# Patient Record
Sex: MALE | Race: WHITE | NOT HISPANIC OR LATINO | URBAN - METROPOLITAN AREA
[De-identification: names, ages, dates, MRNs, and addresses within clinical notes are randomized per-mention and may not be internally consistent; named-entity substitution may affect disease eponyms.]

---

## 2020-07-22 ENCOUNTER — OFFICE VISIT (OUTPATIENT)
Dept: URBAN - METROPOLITAN AREA CLINIC 113 | Facility: CLINIC | Age: 75
End: 2020-07-22

## 2020-07-24 ENCOUNTER — OFFICE VISIT (OUTPATIENT)
Dept: URBAN - METROPOLITAN AREA SURGERY CENTER 25 | Facility: SURGERY CENTER | Age: 75
End: 2020-07-24

## 2020-07-24 ENCOUNTER — CLAIMS CREATED FROM THE CLAIM WINDOW (OUTPATIENT)
Dept: URBAN - METROPOLITAN AREA CLINIC 4 | Facility: CLINIC | Age: 75
End: 2020-07-24
Payer: MEDICARE

## 2020-07-24 DIAGNOSIS — D12.2 BENIGN NEOPLASM OF ASCENDING COLON: ICD-10-CM

## 2020-07-24 DIAGNOSIS — D12.0 BENIGN NEOPLASM OF CECUM: ICD-10-CM

## 2020-07-24 PROCEDURE — 88305 TISSUE EXAM BY PATHOLOGIST: CPT | Performed by: PATHOLOGY

## 2020-07-25 ENCOUNTER — TELEPHONE ENCOUNTER (OUTPATIENT)
Dept: URBAN - METROPOLITAN AREA CLINIC 13 | Facility: CLINIC | Age: 75
End: 2020-07-25

## 2020-07-25 RX ORDER — HYDROCHLOROTHIAZIDE 25 MG/1
TAKE 1 TABLET DAILY TABLET ORAL
Qty: 90 | Refills: 0 | OUTPATIENT
Start: 2012-08-01 | End: 2016-12-16

## 2020-07-25 RX ORDER — HYDROCODONE BITARTRATE AND ACETAMINOPHEN 7.5; 325 MG/1; MG/1
TAKE 1 TABLET EVERY 6 HOURS AS NEEDED FOR PAIN TABLET ORAL
Refills: 0 | OUTPATIENT
End: 2020-07-22

## 2020-07-25 RX ORDER — PANTOPRAZOLE SODIUM 40 MG/1
TAKE 1 TABLET TWICE DAILY TABLET, DELAYED RELEASE ORAL
Qty: 60 | Refills: 0 | OUTPATIENT
Start: 2014-04-24 | End: 2014-07-23

## 2020-07-25 RX ORDER — POLYETHYLENE GLYCOL 3350, SODIUM CHLORIDE, SODIUM BICARBONATE AND POTASSIUM CHLORIDE WITH LEMON FLAVOR 420; 11.2; 5.72; 1.48 G/4L; G/4L; G/4L; G/4L
USE AS DIRECTED POWDER, FOR SOLUTION ORAL
Qty: 1 | Refills: 0 | OUTPATIENT
Start: 2013-01-14 | End: 2013-02-01

## 2020-07-25 RX ORDER — POLYETHYLENE GLYCOL 3350, SODIUM CHLORIDE, SODIUM BICARBONATE AND POTASSIUM CHLORIDE WITH LEMON FLAVOR 420; 11.2; 5.72; 1.48 G/4L; G/4L; G/4L; G/4L
TAKE HALF 5PM THE EVENING BEFORE THE PROCEDURE, AND TAKE THE OTHER HALF 6 HOURS BEFORE THE PROCEDURE POWDER, FOR SOLUTION ORAL
Qty: 1 | Refills: 0 | OUTPATIENT
Start: 2016-12-16 | End: 2017-02-01

## 2020-07-25 RX ORDER — FLUCONAZOLE 100 MG/1
TAKE 2 TABLETS ON DAY 1 THEN TAKE 1 TABLET BY MOUTH EVERY DAY AS DIRECTED TABLET ORAL
Qty: 10 | Refills: 0 | OUTPATIENT
Start: 2014-07-23 | End: 2016-12-16

## 2020-07-25 RX ORDER — POLYETHYLENE GLYCOL 3350, SODIUM CHLORIDE, SODIUM BICARBONATE AND POTASSIUM CHLORIDE WITH LEMON FLAVOR 420; 11.2; 5.72; 1.48 G/4L; G/4L; G/4L; G/4L
USE AS DIRECTED POWDER, FOR SOLUTION ORAL
Qty: 1 | Refills: 1 | OUTPATIENT
Start: 2020-07-22 | End: 2020-07-24

## 2020-07-25 RX ORDER — CARVEDILOL 6.25 MG/1
TAKE 1 TABLET TWICE DAILY,  WITH MORNING AND EVENING MEAL TABLET, FILM COATED ORAL
Qty: 60 | Refills: 0 | OUTPATIENT
Start: 2013-05-30 | End: 2020-07-22

## 2020-07-25 RX ORDER — VENLAFAXINE HYDROCHLORIDE 37.5 MG/1
TAKE 1 CAPSULE DAILY CAPSULE, EXTENDED RELEASE ORAL
Qty: 30 | Refills: 0 | OUTPATIENT
Start: 2012-12-30 | End: 2014-04-25

## 2020-07-25 RX ORDER — AMLODIPINE BESYLATE 10 MG/1
TAKE 1 TABLET DAILY TABLET ORAL
Refills: 0 | OUTPATIENT
Start: 2013-01-02 | End: 2020-07-22

## 2020-07-25 RX ORDER — MULTIVITAMIN
TAKE 1 CAPSULE DAILY CAPSULE ORAL
Refills: 0 | OUTPATIENT
End: 2020-07-24

## 2020-07-25 RX ORDER — PANTOPRAZOLE SODIUM 40 MG/1
TAKE 1 TABLET 30 MINUTES BEFORE BREAKFAST DAILY TABLET, DELAYED RELEASE ORAL
Qty: 30 | Refills: 5 | OUTPATIENT
Start: 2014-08-21 | End: 2016-12-16

## 2020-07-25 RX ORDER — AMITRIPTYLINE HYDROCHLORIDE 75 MG/1
TAKE 1 TABLET AT BEDTIME.  PT STATES DOSE IS 12.5MG TABLET, FILM COATED ORAL
Refills: 0 | OUTPATIENT
End: 2020-07-22

## 2020-07-25 RX ORDER — DEXTRIN 3 G/3.5 G
TAKE AS DIRECTED, PT STATES UNKNOWN DOSE POWDER (GRAM) ORAL
Refills: 0 | OUTPATIENT
End: 2017-02-01

## 2020-07-26 ENCOUNTER — TELEPHONE ENCOUNTER (OUTPATIENT)
Dept: URBAN - METROPOLITAN AREA CLINIC 13 | Facility: CLINIC | Age: 75
End: 2020-07-26

## 2020-07-26 RX ORDER — DICLOFENAC SODIUM 1 %
GEL (GRAM) TOPICAL
Qty: 100 | Refills: 0 | Status: ACTIVE | COMMUNITY
Start: 2014-12-02

## 2020-07-26 RX ORDER — LIDOCAINE HYDROCHLORIDE 20 MG/ML
SOLUTION ORAL; TOPICAL
Qty: 100 | Refills: 0 | Status: ACTIVE | COMMUNITY
Start: 2019-08-04

## 2020-07-26 RX ORDER — OXYCODONE AND ACETAMINOPHEN 5; 325 MG/1; MG/1
TABLET ORAL
Qty: 40 | Refills: 0 | Status: ACTIVE | COMMUNITY
Start: 2014-09-10

## 2020-07-26 RX ORDER — CHLORHEXIDINE GLUCONATE 4 %
TAKE 1 TABLET DAILY AS DIRECTED LIQUID (ML) TOPICAL
Refills: 0 | Status: ACTIVE | COMMUNITY

## 2020-07-26 RX ORDER — POTASSIUM CHLORIDE 1500 MG/1
TAKE 1 TABLET DAILY TABLET, EXTENDED RELEASE ORAL
Qty: 30 | Refills: 0 | Status: ACTIVE | COMMUNITY
Start: 2013-06-07

## 2020-07-26 RX ORDER — HYDROXYZINE HYDROCHLORIDE 10 MG/1
TABLET ORAL
Qty: 60 | Refills: 0 | Status: ACTIVE | COMMUNITY
Start: 2020-07-15

## 2020-07-26 RX ORDER — DIAZEPAM 5 MG/1
TABLET ORAL
Qty: 90 | Refills: 0 | Status: ACTIVE | COMMUNITY
Start: 2014-03-21

## 2020-07-26 RX ORDER — HYDROCODONE BITARTRATE AND ACETAMINOPHEN 7.5; 325 MG/1; MG/1
TABLET ORAL
Qty: 60 | Refills: 0 | Status: ACTIVE | COMMUNITY
Start: 2012-09-26

## 2020-07-26 RX ORDER — CARVEDILOL 3.12 MG/1
TAKE 1 TABLET TWICE DAILY,  WITH MORNING AND EVENING MEAL TABLET, FILM COATED ORAL
Refills: 0 | Status: ACTIVE | COMMUNITY
Start: 2020-06-29

## 2020-07-26 RX ORDER — HYDROCHLOROTHIAZIDE 25 MG/1
TABLET ORAL
Qty: 90 | Refills: 0 | Status: ACTIVE | COMMUNITY
Start: 2012-05-28

## 2020-07-26 RX ORDER — ATORVASTATIN CALCIUM 20 MG/1
TABLET, FILM COATED ORAL
Qty: 90 | Refills: 0 | Status: ACTIVE | COMMUNITY
Start: 2012-08-15

## 2020-07-26 RX ORDER — CEPHALEXIN 500 MG/1
CAPSULE ORAL
Qty: 20 | Refills: 0 | Status: ACTIVE | COMMUNITY
Start: 2014-03-21

## 2020-07-26 RX ORDER — ATORVASTATIN CALCIUM 40 MG/1
TABLET, FILM COATED ORAL
Qty: 90 | Refills: 0 | Status: ACTIVE | COMMUNITY
Start: 2012-03-06

## 2020-07-26 RX ORDER — OXYCODONE 15 MG/1
TAKE 1 TABLET 4 TIMES DAILY TABLET ORAL
Refills: 0 | Status: ACTIVE | COMMUNITY
Start: 2020-06-16

## 2020-07-26 RX ORDER — CEPHALEXIN 250 MG/1
CAPSULE ORAL
Qty: 21 | Refills: 0 | Status: ACTIVE | COMMUNITY
Start: 2019-08-08

## 2020-07-26 RX ORDER — POLYETHYLENE GLYCOL 3350 AND ELECTROLYTES WITH LEMON FLAVOR 236; 22.74; 6.74; 5.86; 2.97 G/4L; G/4L; G/4L; G/4L; G/4L
POWDER, FOR SOLUTION ORAL
Qty: 255 | Refills: 0 | Status: ACTIVE | COMMUNITY
Start: 2013-01-14

## 2020-07-26 RX ORDER — DICLOFENAC SODIUM 10 MG/G
GEL TOPICAL
Qty: 500 | Refills: 0 | Status: ACTIVE | COMMUNITY
Start: 2019-08-02

## 2020-07-26 RX ORDER — DIAZEPAM 5 MG/1
TABLET ORAL
Qty: 3 | Refills: 0 | Status: ACTIVE | COMMUNITY
Start: 2014-02-04

## 2020-07-26 RX ORDER — ATORVASTATIN CALCIUM 40 MG/1
TABLET, FILM COATED ORAL
Qty: 90 | Refills: 0 | Status: ACTIVE | COMMUNITY
Start: 2012-05-28

## 2020-07-26 RX ORDER — HYDROCODONE BITARTRATE AND ACETAMINOPHEN 10; 325 MG/1; MG/1
TAKE 1 TABLET BY MOUTH EVERY 4 TO 6 HOURS AS NEEDED FOR PAIN TABLET ORAL
Qty: 80 | Refills: 0 | Status: ACTIVE | COMMUNITY
Start: 2012-08-28

## 2020-07-26 RX ORDER — AMLODIPINE BESYLATE 5 MG/1
TAKE 1 TABLET DAILY TABLET ORAL
Refills: 0 | Status: ACTIVE | COMMUNITY
Start: 2020-06-29

## 2020-08-25 ENCOUNTER — OFFICE VISIT (OUTPATIENT)
Dept: URBAN - METROPOLITAN AREA CLINIC 113 | Facility: CLINIC | Age: 75
End: 2020-08-25

## 2020-08-25 NOTE — HPI-TODAY'S VISIT:
This is a 74-year-old male with a history of chronic back pain status post back surgery on chronic narcotics, dysphagia secondary to Candida esophagitis status post treatment with Diflucan in 2014, and adenomatous colon polyps, presenting for follow-up colonoscopy. He was last seen 7/22/2020 with report of a recent diarrhea predominant change in bowel habits, that had self resolved.  He was recommended surveillance colonoscopy for history of colon adenomas. Colonoscopy was performed 7/24/2020 notable for good bowel preparation, removal of 7 polyps ranging 3 to 6 mm in size, diverticulosis in the sigmoid and descending colon, significant colon spasm, nonbleeding internal hemorrhoids.  Pathology demonstrated tubular adenomas.  Repeat colonoscopy is recommended in 3 years.

## 2021-02-22 ENCOUNTER — OFFICE VISIT (OUTPATIENT)
Dept: URBAN - METROPOLITAN AREA CLINIC 107 | Facility: CLINIC | Age: 76
End: 2021-02-22
Payer: MEDICARE

## 2021-02-22 ENCOUNTER — WEB ENCOUNTER (OUTPATIENT)
Dept: URBAN - METROPOLITAN AREA CLINIC 107 | Facility: CLINIC | Age: 76
End: 2021-02-22

## 2021-02-22 VITALS
BODY MASS INDEX: 22.35 KG/M2 | TEMPERATURE: 98.8 F | HEART RATE: 102 BPM | SYSTOLIC BLOOD PRESSURE: 127 MMHG | WEIGHT: 165 LBS | HEIGHT: 72 IN | DIASTOLIC BLOOD PRESSURE: 75 MMHG

## 2021-02-22 DIAGNOSIS — Z86.010 HISTORY OF COLON POLYPS: ICD-10-CM

## 2021-02-22 DIAGNOSIS — K59.01 SLOW TRANSIT CONSTIPATION: ICD-10-CM

## 2021-02-22 PROBLEM — 35298007: Status: ACTIVE | Noted: 2021-02-22

## 2021-02-22 PROCEDURE — 99203 OFFICE O/P NEW LOW 30 MIN: CPT | Performed by: NURSE PRACTITIONER

## 2021-02-22 RX ORDER — HYDROCODONE BITARTRATE AND ACETAMINOPHEN 10; 325 MG/1; MG/1
TAKE 1 TABLET BY MOUTH EVERY 4 TO 6 HOURS AS NEEDED FOR PAIN TABLET ORAL
Qty: 80 | Refills: 0 | Status: ON HOLD | COMMUNITY
Start: 2012-08-28

## 2021-02-22 RX ORDER — CEPHALEXIN 250 MG/1
CAPSULE ORAL
Qty: 21 | Refills: 0 | Status: ON HOLD | COMMUNITY
Start: 2019-08-08

## 2021-02-22 RX ORDER — POLYETHYLENE GLYCOL 3350 AND ELECTROLYTES WITH LEMON FLAVOR 236; 22.74; 6.74; 5.86; 2.97 G/4L; G/4L; G/4L; G/4L; G/4L
POWDER, FOR SOLUTION ORAL
Qty: 255 | Refills: 0 | Status: ON HOLD | COMMUNITY
Start: 2013-01-14

## 2021-02-22 RX ORDER — CEPHALEXIN 500 MG/1
CAPSULE ORAL
Qty: 20 | Refills: 0 | Status: ON HOLD | COMMUNITY
Start: 2014-03-21

## 2021-02-22 RX ORDER — VENLAFAXINE HYDROCHLORIDE 37.5 MG/1
1 TABLET WITH FOOD TABLET ORAL ONCE A DAY
Status: ACTIVE | COMMUNITY

## 2021-02-22 RX ORDER — HYDROCHLOROTHIAZIDE 25 MG/1
TABLET ORAL
Qty: 90 | Refills: 0 | Status: ON HOLD | COMMUNITY
Start: 2012-05-28

## 2021-02-22 RX ORDER — OXYCODONE AND ACETAMINOPHEN 5; 325 MG/1; MG/1
TABLET ORAL
Qty: 40 | Refills: 0 | Status: ACTIVE | COMMUNITY
Start: 2014-09-10

## 2021-02-22 RX ORDER — LIDOCAINE HYDROCHLORIDE 20 MG/ML
SOLUTION ORAL; TOPICAL
Qty: 100 | Refills: 0 | Status: ON HOLD | COMMUNITY
Start: 2019-08-04

## 2021-02-22 RX ORDER — ATORVASTATIN CALCIUM 40 MG/1
TABLET, FILM COATED ORAL
Qty: 90 | Refills: 0 | Status: ON HOLD | COMMUNITY
Start: 2012-03-06

## 2021-02-22 RX ORDER — POTASSIUM CHLORIDE 1500 MG/1
TAKE 1 TABLET DAILY TABLET, EXTENDED RELEASE ORAL
Qty: 30 | Refills: 0 | Status: ACTIVE | COMMUNITY
Start: 2013-06-07

## 2021-02-22 RX ORDER — OXYCODONE 15 MG/1
TAKE 1 TABLET 4 TIMES DAILY TABLET ORAL
Refills: 0 | Status: ON HOLD | COMMUNITY
Start: 2020-06-16

## 2021-02-22 RX ORDER — DICLOFENAC SODIUM 1 %
GEL (GRAM) TOPICAL
Qty: 100 | Refills: 0 | Status: ON HOLD | COMMUNITY
Start: 2014-12-02

## 2021-02-22 RX ORDER — DICLOFENAC SODIUM 10 MG/G
GEL TOPICAL
Qty: 500 | Refills: 0 | Status: ON HOLD | COMMUNITY
Start: 2019-08-02

## 2021-02-22 RX ORDER — DIAZEPAM 5 MG/1
TABLET ORAL
Qty: 3 | Refills: 0 | Status: ON HOLD | COMMUNITY
Start: 2014-02-04

## 2021-02-22 RX ORDER — HYDROXYZINE HYDROCHLORIDE 10 MG/1
TABLET ORAL
Qty: 60 | Refills: 0 | Status: ON HOLD | COMMUNITY
Start: 2020-07-15

## 2021-02-22 RX ORDER — ATORVASTATIN CALCIUM 20 MG/1
TABLET, FILM COATED ORAL
Qty: 90 | Refills: 0 | Status: ACTIVE | COMMUNITY
Start: 2012-08-15

## 2021-02-22 RX ORDER — CHLORHEXIDINE GLUCONATE 4 %
TAKE 1 TABLET DAILY AS DIRECTED LIQUID (ML) TOPICAL
Refills: 0 | Status: ACTIVE | COMMUNITY

## 2021-02-22 RX ORDER — AMLODIPINE BESYLATE 5 MG/1
TAKE 1 TABLET DAILY TABLET ORAL
Refills: 0 | Status: ACTIVE | COMMUNITY
Start: 2020-06-29

## 2021-02-22 RX ORDER — CARVEDILOL 3.12 MG/1
TAKE 1 TABLET TWICE DAILY,  WITH MORNING AND EVENING MEAL TABLET, FILM COATED ORAL
Refills: 0 | Status: ACTIVE | COMMUNITY
Start: 2020-06-29

## 2021-02-22 RX ORDER — HYDROCODONE BITARTRATE AND ACETAMINOPHEN 7.5; 325 MG/1; MG/1
TABLET ORAL
Qty: 60 | Refills: 0 | Status: ON HOLD | COMMUNITY
Start: 2012-09-26

## 2021-02-22 NOTE — HPI-TODAY'S VISIT:
75-year-old male with a history of chronic back pain status post back surgery on chronic narcotics, dysphagia secondary to Candida esophagitis status post treatment with Diflucan (2014) and adenomatous colon polyps, presenting for follow-up regarding constipation. He was last seen in the office on 7/22/2020.  He reported a transient episode of diarrhea predominant change in bowel habits which resolved on its own.  He was noted to be due for surveillance colonoscopy given his personal history of tubular adenomas.  This was scheduled at his convenience. Colonoscopy was performed 7/24/2020.  Findings included good bowel preparation with [place] bowel preparation scale score 9.  7 polyps (3 to 6 mm) were resected and retrieved.  Diverticulosis noted in the sigmoid colon and descending colon.  Nonbleeding internal hemorrhoids.  Significant colon spasm.  Pathology revealed tubular adenomas.  Repeat colonoscopy is recommended in 3 years.  He reports that he is constipated, "living off laxatives for the last 3 weeks." He recently was on antibiotics and prednisone for a urinary tract infection and rheumatologic disease. He is requirine laxatives about every 2 days. There is no lower abdominal pain. He uses dulcolax , epsom salts, and equate clearlax. He does not use anything daily. No blood per rectum. When he poops, he does feel relieved. Stools are loose due to laxatives. There is no nausea or vomiting. Appetite is fair. He takes oxycodone infrequently, but then states that he has been taking one tablet every morning for 30-something years. Historically, powder fibers have been ineffective. He admits increased flatulence.

## 2022-01-04 ENCOUNTER — OFFICE VISIT (OUTPATIENT)
Dept: URBAN - METROPOLITAN AREA CLINIC 113 | Facility: CLINIC | Age: 77
End: 2022-01-04
Payer: MEDICARE

## 2022-01-04 VITALS
SYSTOLIC BLOOD PRESSURE: 169 MMHG | RESPIRATION RATE: 20 BRPM | TEMPERATURE: 96.8 F | WEIGHT: 179 LBS | DIASTOLIC BLOOD PRESSURE: 101 MMHG | BODY MASS INDEX: 24.24 KG/M2 | HEART RATE: 104 BPM | HEIGHT: 72 IN

## 2022-01-04 DIAGNOSIS — R19.4 CHANGE IN BOWEL HABITS: ICD-10-CM

## 2022-01-04 DIAGNOSIS — R19.7 DIARRHEA, UNSPECIFIED TYPE: ICD-10-CM

## 2022-01-04 DIAGNOSIS — L29.9 GENERALIZED PRURITUS: ICD-10-CM

## 2022-01-04 PROCEDURE — 99213 OFFICE O/P EST LOW 20 MIN: CPT | Performed by: PHYSICIAN ASSISTANT

## 2022-01-04 RX ORDER — VENLAFAXINE HYDROCHLORIDE 37.5 MG/1
1 TABLET WITH FOOD TABLET ORAL ONCE A DAY
Status: ACTIVE | COMMUNITY

## 2022-01-04 RX ORDER — CEPHALEXIN 500 MG/1
CAPSULE ORAL
Qty: 20 | Refills: 0 | Status: ON HOLD | COMMUNITY
Start: 2014-03-21

## 2022-01-04 RX ORDER — HYDROCODONE BITARTRATE AND ACETAMINOPHEN 10; 325 MG/1; MG/1
TAKE 1 TABLET BY MOUTH EVERY 4 TO 6 HOURS AS NEEDED FOR PAIN TABLET ORAL
Qty: 80 | Refills: 0 | Status: ON HOLD | COMMUNITY
Start: 2012-08-28

## 2022-01-04 RX ORDER — HYDROCHLOROTHIAZIDE 25 MG/1
TABLET ORAL
Qty: 90 | Refills: 0 | Status: ON HOLD | COMMUNITY
Start: 2012-05-28

## 2022-01-04 RX ORDER — UBIDECARENONE 30 MG
AS DIRECTED CAPSULE ORAL
Status: ACTIVE | COMMUNITY

## 2022-01-04 RX ORDER — CARVEDILOL 3.12 MG/1
TAKE 1 TABLET TWICE DAILY,  WITH MORNING AND EVENING MEAL TABLET, FILM COATED ORAL
Refills: 0 | Status: ACTIVE | COMMUNITY
Start: 2020-06-29

## 2022-01-04 RX ORDER — CEPHALEXIN 250 MG/1
CAPSULE ORAL
Qty: 21 | Refills: 0 | Status: ON HOLD | COMMUNITY
Start: 2019-08-08

## 2022-01-04 RX ORDER — DIAZEPAM 5 MG/1
TABLET ORAL
Qty: 3 | Refills: 0 | Status: ON HOLD | COMMUNITY
Start: 2014-02-04

## 2022-01-04 RX ORDER — HYDROXYZINE HYDROCHLORIDE 10 MG/1
TABLET ORAL
Qty: 60 | Refills: 0 | Status: ON HOLD | COMMUNITY
Start: 2020-07-15

## 2022-01-04 RX ORDER — POTASSIUM CHLORIDE 1500 MG/1
TAKE 1 TABLET DAILY TABLET, EXTENDED RELEASE ORAL
Qty: 30 | Refills: 0 | Status: ACTIVE | COMMUNITY
Start: 2013-06-07

## 2022-01-04 RX ORDER — OXYCODONE AND ACETAMINOPHEN 5; 325 MG/1; MG/1
TABLET ORAL
Qty: 40 | Refills: 0 | Status: DISCONTINUED | COMMUNITY
Start: 2014-09-10

## 2022-01-04 RX ORDER — DICLOFENAC SODIUM 10 MG/G
GEL TOPICAL
Qty: 500 | Refills: 0 | Status: ON HOLD | COMMUNITY
Start: 2019-08-02

## 2022-01-04 RX ORDER — ATORVASTATIN CALCIUM 40 MG/1
TABLET, FILM COATED ORAL
Qty: 90 | Refills: 0 | Status: ON HOLD | COMMUNITY
Start: 2012-03-06

## 2022-01-04 RX ORDER — OXYCODONE 15 MG/1
TAKE 1 TABLET 4 TIMES DAILY TABLET ORAL
Refills: 0 | Status: ON HOLD | COMMUNITY
Start: 2020-06-16

## 2022-01-04 RX ORDER — HYDROCODONE BITARTRATE AND ACETAMINOPHEN 7.5; 325 MG/1; MG/1
TABLET ORAL
Qty: 60 | Refills: 0 | Status: ON HOLD | COMMUNITY
Start: 2012-09-26

## 2022-01-04 RX ORDER — POLYETHYLENE GLYCOL 3350 AND ELECTROLYTES WITH LEMON FLAVOR 236; 22.74; 6.74; 5.86; 2.97 G/4L; G/4L; G/4L; G/4L; G/4L
POWDER, FOR SOLUTION ORAL
Qty: 255 | Refills: 0 | Status: ON HOLD | COMMUNITY
Start: 2013-01-14

## 2022-01-04 RX ORDER — DICLOFENAC SODIUM 1 %
GEL (GRAM) TOPICAL
Qty: 100 | Refills: 0 | Status: ON HOLD | COMMUNITY
Start: 2014-12-02

## 2022-01-04 RX ORDER — CHLORHEXIDINE GLUCONATE 4 %
TAKE 1 TABLET DAILY AS DIRECTED LIQUID (ML) TOPICAL
Refills: 0 | Status: ACTIVE | COMMUNITY

## 2022-01-04 RX ORDER — ATORVASTATIN CALCIUM 20 MG/1
1 TABLET TABLET, FILM COATED ORAL ONCE A DAY
Refills: 0 | Status: ACTIVE | COMMUNITY
Start: 2012-08-15

## 2022-01-04 RX ORDER — LIDOCAINE HYDROCHLORIDE 20 MG/ML
SOLUTION ORAL; TOPICAL
Qty: 100 | Refills: 0 | Status: ON HOLD | COMMUNITY
Start: 2019-08-04

## 2022-01-04 RX ORDER — AMLODIPINE BESYLATE 5 MG/1
TAKE 1 TABLET DAILY TABLET ORAL
Refills: 0 | Status: ACTIVE | COMMUNITY
Start: 2020-06-29

## 2022-01-04 NOTE — HPI-TODAY'S VISIT:
Mr. Davis is a 76-year-old gentleman with history of chronic back pain status post back surgery on chronic narcotics, dysphagia secondary to Candida esophagitis status post treatment with Diflucan (2014) and adenomatous colon polyps, presenting with stated complaints of change in bowel habits. He was last seen on 2/22/2021 for follow-up regarding chronic constipation.  His symptoms were thought to be secondary to narcotic pain medication use.  He was recommended to adhere to bowel regimen consisting of  along with MiraLAX and milk of magnesia as needed.  He was instructed to follow-up in 4 weeks, however, it appears that he never followed up with this office   For the past several days he has noticed a diarrhea predominant change in bowel habits after completing a prednisone taper.  He states that he was on prednisone for about a year for chronic arthralgias.  He states that he has up to six loose, soft bowel movements daily without red blood per rectum, melena or hematochezia.  Denies nocturnal diarrhea or fecal incontinence. He denies any associated abdominal pain, nausea or vomiting.  Denies antecedent antibiotic use.  He also endorses generalized pruritus which she believes is related to prednisone use.  He has an appointment with a dermatologist in the next couple of weeks for evaluation of the rash.  He also reports an 8 to 10 pound weight loss secondary to "poor appetite".  He is unable to attribute his diarrhea to any other known precipitating factor.  Denies any known sick contacts or recent travel.

## 2022-01-04 NOTE — PHYSICAL EXAM SKIN:
Erythematous, dry and excoriated skin noted to the nasolabial folds, upper chest, and bilateral upper extremities. No palpable lumps or  bumps.

## 2022-01-04 NOTE — HPI-OTHER HISTORIES
Colonoscopy (2/1/2017): [place] bowel preparation scale score of nine. Five polyps (3 to 6 mm) resected and retrieved.  Nonbleeding internal hemorrhoids.  Diverticulosis in the sigmoid colon.  Pathology revealed these to be a combination of benign mucosal polyps and tubular adenomas. EGD (7/23/2014): Diffuse candidiasis, mild erosive gastropathy, normal duodenum to D2.  Esophageal biopsies were negative for fungal organisms, gastric  biopsy demonstrated nonspecific changes.  No evidence of H. pylori.  Colonoscopy (2/1/2013): Adequate bowel prep.  Four diminutive polyps in the distal transverse colon.  Diverticulosis in the sigmoid colon.  These were noted to be sessile serrated adenomas without high-grade dysplasia  or malignancy. Colonoscopy (1/24/2008): Adequate bowel prep.  A 9 mm polyp at  55 cm proximal to the anus. Two 5 mm polyps  in the rectosigmoid colon and in the transverse colon.  Diverticulosis of the sigmoid colon.  Internal hemorrhoids were found.  These polyps were noted to be tubular adenomas.

## 2022-01-05 LAB
A/G RATIO: 1.7
ALBUMIN: 4.3
ALKALINE PHOSPHATASE: 96
ALT (SGPT): 10
AST (SGOT): 14
BASO (ABSOLUTE): 0.1
BASOS: 1
BILIRUBIN, TOTAL: 0.5
BUN/CREATININE RATIO: 7
BUN: 7
CALCIUM: 9.4
CARBON DIOXIDE, TOTAL: 27
CHLORIDE: 97
CREATININE: 0.96
EGFR IF AFRICN AM: 88
EGFR IF NONAFRICN AM: 76
EOS (ABSOLUTE): 0.2
EOS: 2
GLOBULIN, TOTAL: 2.6
GLUCOSE: 98
HEMATOCRIT: 39.8
HEMATOLOGY COMMENTS:: (no result)
HEMOGLOBIN: 13.5
IMMATURE CELLS: (no result)
IMMATURE GRANS (ABS): 0
IMMATURE GRANULOCYTES: 0
LYMPHS (ABSOLUTE): 2.1
LYMPHS: 22
MCH: 29.7
MCHC: 33.9
MCV: 88
MONOCYTES(ABSOLUTE): 1.2
MONOCYTES: 12
NEUTROPHILS (ABSOLUTE): 6.1
NEUTROPHILS: 63
NRBC: (no result)
PLATELETS: 365
POTASSIUM: 4.2
PROTEIN, TOTAL: 6.9
RBC: 4.55
RDW: 12.3
SODIUM: 138
WBC: 9.6

## 2022-01-09 LAB
ADENOVIRUS F 40/41: NOT DETECTED
ASTROVIRUS: NOT DETECTED
C DIFFICILE TOXIN A/B: NOT DETECTED
CALPROTECTIN, FECAL: 32
CAMPYLOBACTER: NOT DETECTED
CRYPTOSPORIDIUM: NOT DETECTED
CYCLOSPORA CAYETANENSIS: NOT DETECTED
E COLI O157: (no result)
ENTAMOEBA HISTOLYTICA: NOT DETECTED
ENTEROAGGREGATIVE E COLI: NOT DETECTED
ENTEROPATHOGENIC E COLI: NOT DETECTED
ENTEROTOXIGENIC E COLI: NOT DETECTED
GIARDIA LAMBLIA: NOT DETECTED
NOROVIRUS GI/GII: NOT DETECTED
PLESIOMONAS SHIGELLOIDES: NOT DETECTED
ROTAVIRUS A: NOT DETECTED
SALMONELLA: NOT DETECTED
SAPOVIRUS: NOT DETECTED
SHIGA-TOXIN-PRODUCING E COLI: NOT DETECTED
SHIGELLA/ENTEROINVASIVE E COLI: NOT DETECTED
VIBRIO CHOLERAE: NOT DETECTED
VIBRIO: NOT DETECTED
YERSINIA ENTEROCOLITICA: NOT DETECTED

## 2022-02-04 ENCOUNTER — WEB ENCOUNTER (OUTPATIENT)
Dept: URBAN - METROPOLITAN AREA CLINIC 113 | Facility: CLINIC | Age: 77
End: 2022-02-04

## 2022-02-04 ENCOUNTER — OFFICE VISIT (OUTPATIENT)
Dept: URBAN - METROPOLITAN AREA CLINIC 113 | Facility: CLINIC | Age: 77
End: 2022-02-04
Payer: MEDICARE

## 2022-02-04 VITALS
TEMPERATURE: 97.7 F | BODY MASS INDEX: 22.98 KG/M2 | WEIGHT: 169.7 LBS | DIASTOLIC BLOOD PRESSURE: 72 MMHG | RESPIRATION RATE: 22 BRPM | HEART RATE: 102 BPM | HEIGHT: 72 IN | SYSTOLIC BLOOD PRESSURE: 114 MMHG

## 2022-02-04 DIAGNOSIS — R19.4 CHANGE IN BOWEL HABITS: ICD-10-CM

## 2022-02-04 DIAGNOSIS — L29.9 GENERALIZED PRURITUS: ICD-10-CM

## 2022-02-04 DIAGNOSIS — R19.7 DIARRHEA, UNSPECIFIED TYPE: ICD-10-CM

## 2022-02-04 PROCEDURE — 99213 OFFICE O/P EST LOW 20 MIN: CPT | Performed by: PHYSICIAN ASSISTANT

## 2022-02-04 RX ORDER — DICLOFENAC SODIUM 1 %
GEL (GRAM) TOPICAL
Qty: 100 | Refills: 0 | Status: ON HOLD | COMMUNITY
Start: 2014-12-02

## 2022-02-04 RX ORDER — DICLOFENAC SODIUM 10 MG/G
GEL TOPICAL
Qty: 500 | Refills: 0 | Status: ON HOLD | COMMUNITY
Start: 2019-08-02

## 2022-02-04 RX ORDER — VENLAFAXINE HYDROCHLORIDE 37.5 MG/1
1 TABLET WITH FOOD TABLET ORAL ONCE A DAY
Status: ACTIVE | COMMUNITY

## 2022-02-04 RX ORDER — CHLORHEXIDINE GLUCONATE 4 %
TAKE 1 TABLET DAILY AS DIRECTED LIQUID (ML) TOPICAL
Refills: 0 | Status: ACTIVE | COMMUNITY

## 2022-02-04 RX ORDER — ATORVASTATIN CALCIUM 40 MG/1
TABLET, FILM COATED ORAL
Qty: 90 | Refills: 0 | Status: ON HOLD | COMMUNITY
Start: 2012-03-06

## 2022-02-04 RX ORDER — HYDROCHLOROTHIAZIDE 25 MG/1
TABLET ORAL
Qty: 90 | Refills: 0 | Status: ON HOLD | COMMUNITY
Start: 2012-05-28

## 2022-02-04 RX ORDER — ATORVASTATIN CALCIUM 20 MG/1
1 TABLET TABLET, FILM COATED ORAL ONCE A DAY
Refills: 0 | Status: ACTIVE | COMMUNITY
Start: 2012-08-15

## 2022-02-04 RX ORDER — UBIDECARENONE 30 MG
AS DIRECTED CAPSULE ORAL
Status: ACTIVE | COMMUNITY

## 2022-02-04 RX ORDER — AMLODIPINE BESYLATE 5 MG/1
TAKE 1 TABLET DAILY TABLET ORAL
Refills: 0 | Status: ACTIVE | COMMUNITY
Start: 2020-06-29

## 2022-02-04 RX ORDER — HYDROCODONE BITARTRATE AND ACETAMINOPHEN 7.5; 325 MG/1; MG/1
TABLET ORAL
Qty: 60 | Refills: 0 | Status: ON HOLD | COMMUNITY
Start: 2012-09-26

## 2022-02-04 RX ORDER — CEPHALEXIN 500 MG/1
CAPSULE ORAL
Qty: 20 | Refills: 0 | Status: ON HOLD | COMMUNITY
Start: 2014-03-21

## 2022-02-04 RX ORDER — CEPHALEXIN 250 MG/1
CAPSULE ORAL
Qty: 21 | Refills: 0 | Status: ON HOLD | COMMUNITY
Start: 2019-08-08

## 2022-02-04 RX ORDER — POLYETHYLENE GLYCOL 3350 AND ELECTROLYTES WITH LEMON FLAVOR 236; 22.74; 6.74; 5.86; 2.97 G/4L; G/4L; G/4L; G/4L; G/4L
POWDER, FOR SOLUTION ORAL
Qty: 255 | Refills: 0 | Status: ON HOLD | COMMUNITY
Start: 2013-01-14

## 2022-02-04 RX ORDER — HYDROXYZINE HYDROCHLORIDE 10 MG/1
TABLET ORAL
Qty: 60 | Refills: 0 | Status: ON HOLD | COMMUNITY
Start: 2020-07-15

## 2022-02-04 RX ORDER — LIDOCAINE HYDROCHLORIDE 20 MG/ML
SOLUTION ORAL; TOPICAL
Qty: 100 | Refills: 0 | Status: ON HOLD | COMMUNITY
Start: 2019-08-04

## 2022-02-04 RX ORDER — OXYCODONE 15 MG/1
TAKE 1 TABLET 4 TIMES DAILY TABLET ORAL
Refills: 0 | Status: ON HOLD | COMMUNITY
Start: 2020-06-16

## 2022-02-04 RX ORDER — POTASSIUM CHLORIDE 1500 MG/1
TAKE 1 TABLET DAILY TABLET, EXTENDED RELEASE ORAL
Qty: 30 | Refills: 0 | Status: ACTIVE | COMMUNITY
Start: 2013-06-07

## 2022-02-04 RX ORDER — CARVEDILOL 3.12 MG/1
TAKE 1 TABLET TWICE DAILY,  WITH MORNING AND EVENING MEAL TABLET, FILM COATED ORAL
Refills: 0 | Status: ACTIVE | COMMUNITY
Start: 2020-06-29

## 2022-02-04 RX ORDER — DIAZEPAM 5 MG/1
TABLET ORAL
Qty: 3 | Refills: 0 | Status: ON HOLD | COMMUNITY
Start: 2014-02-04

## 2022-02-04 RX ORDER — HYDROCODONE BITARTRATE AND ACETAMINOPHEN 10; 325 MG/1; MG/1
TAKE 1 TABLET BY MOUTH EVERY 4 TO 6 HOURS AS NEEDED FOR PAIN TABLET ORAL
Qty: 80 | Refills: 0 | Status: ON HOLD | COMMUNITY
Start: 2012-08-28

## 2022-02-04 NOTE — HPI-OTHER HISTORIES
Colonoscopy (2/1/2017): [place] bowel preparation scale score of nine. Five polyps (3 to 6 mm) resected and retrieved.  Nonbleeding internal hemorrhoids.  Diverticulosis in the sigmoid colon.  Pathology revealed these to be a combination of benign mucosal polyps and tubular adenomas. EGD (7/23/2014): Diffuse candidiasis, mild erosive gastropathy, normal duodenum to D2.  Esophageal biopsies were negative for fungal organisms, gastric  biopsy demonstrated nonspecific changes.  No evidence of H. pylori.  Colonoscopy (2/1/2013): Adequate bowel prep.  Four diminutive polyps in the distal transverse colon.  Diverticulosis in the sigmoid colon.  These were noted to be sessile serrated adenomas without high-grade dysplasia  or malignancy. Colonoscopy (1/24/2008): Adequate bowel prep.  A 9 mm polyp at  55 cm proximal to the anus. Two 5 mm polyps  in the rectosigmoid colon and in the transverse colon.  Diverticulosis of the sigmoid colon.  Internal hemorrhoids were found.  These polyps were noted to be tubular adenomas.

## 2022-02-04 NOTE — HPI-TODAY'S VISIT:
Mr. Davis is a 76-year-old gentleman with a history of chronic back pain status post back surgery on chronic narcotics, dysphagia secondary to Candida esophagitis status post treatment with Diflucan (2014), and adenomatous colon polyps, presenting for follow-up. He was last seen on 1/4/2022 for follow-up regarding a diarrhea predominant change in bowel habits.  Labs were obtained at that time.  He was instructed to use pediatric dose of Imodium as needed.  He was instructed to use Benadryl as needed and topical calamine lotion for symptomatic relief of skin rash.  Labs (1/6/2022): Negative stool studies.  Negative fecal calprotectin.  CMP demonstrated BUN of 7 and creatinine 0.69, otherwise unremarkable LFTs.  CBC was relatively unremarkable with stable H/H of 13.5/29.8. He states that he feels well today.  He denies any further complaints of uncontrollable diarrhea.  He is having regular soft, formed bowel movements daily without red blood per rectum, melena or hematochezia.  He has not required a dose of Imodium for the past couple of weeks.  Unfortunately, he still reports complaints of diffuse pruritus.  He has yet to follow-up with a dermatologist.  He otherwise denies any other GI complaints.  No dysphagia, regurgitation or abdominal pain.  No jaundice, dark urine or bilateral lower extremity edema.

## 2022-02-12 PROBLEM — 129851009: Status: ACTIVE | Noted: 2022-01-15

## 2022-02-12 PROBLEM — 276444007: Status: ACTIVE | Noted: 2022-01-15

## 2022-02-12 PROBLEM — 62315008: Status: ACTIVE | Noted: 2022-01-15

## 2024-01-17 ENCOUNTER — DASHBOARD ENCOUNTERS (OUTPATIENT)
Age: 79
End: 2024-01-17

## 2024-01-17 ENCOUNTER — OFFICE VISIT (OUTPATIENT)
Dept: URBAN - METROPOLITAN AREA CLINIC 107 | Facility: CLINIC | Age: 79
End: 2024-01-17
Payer: MEDICARE

## 2024-01-17 VITALS
BODY MASS INDEX: 22.65 KG/M2 | DIASTOLIC BLOOD PRESSURE: 103 MMHG | HEIGHT: 72 IN | WEIGHT: 167.2 LBS | TEMPERATURE: 97.1 F | SYSTOLIC BLOOD PRESSURE: 124 MMHG | HEART RATE: 98 BPM

## 2024-01-17 DIAGNOSIS — D50.9 IRON DEFICIENCY ANEMIA, UNSPECIFIED IRON DEFICIENCY ANEMIA TYPE: ICD-10-CM

## 2024-01-17 DIAGNOSIS — Z86.010 HISTORY OF ADENOMATOUS POLYP OF COLON: ICD-10-CM

## 2024-01-17 PROBLEM — 429047008: Status: ACTIVE | Noted: 2024-01-17

## 2024-01-17 PROBLEM — 87522002: Status: ACTIVE | Noted: 2024-01-17

## 2024-01-17 PROCEDURE — 99213 OFFICE O/P EST LOW 20 MIN: CPT | Performed by: INTERNAL MEDICINE

## 2024-01-17 RX ORDER — LIDOCAINE HYDROCHLORIDE 20 MG/ML
SOLUTION ORAL; TOPICAL
Qty: 100 | Refills: 0 | Status: ON HOLD | COMMUNITY
Start: 2019-08-04

## 2024-01-17 RX ORDER — HYDROCODONE BITARTRATE AND ACETAMINOPHEN 7.5; 325 MG/1; MG/1
TABLET ORAL
Qty: 60 | Refills: 0 | Status: ON HOLD | COMMUNITY
Start: 2012-09-26

## 2024-01-17 RX ORDER — DICLOFENAC SODIUM 1 %
GEL (GRAM) TOPICAL
Qty: 100 | Refills: 0 | Status: ON HOLD | COMMUNITY
Start: 2014-12-02

## 2024-01-17 RX ORDER — HYDROCHLOROTHIAZIDE 25 MG/1
TABLET ORAL
Qty: 90 | Refills: 0 | Status: ON HOLD | COMMUNITY
Start: 2012-05-28

## 2024-01-17 RX ORDER — UBIDECARENONE 30 MG
AS DIRECTED CAPSULE ORAL
Status: ON HOLD | COMMUNITY

## 2024-01-17 RX ORDER — CEPHALEXIN 250 MG/1
CAPSULE ORAL
Qty: 21 | Refills: 0 | Status: ON HOLD | COMMUNITY
Start: 2019-08-08

## 2024-01-17 RX ORDER — POLYETHYLENE GLYCOL 3350 AND ELECTROLYTES WITH LEMON FLAVOR 236; 22.74; 6.74; 5.86; 2.97 G/4L; G/4L; G/4L; G/4L; G/4L
POWDER, FOR SOLUTION ORAL
Qty: 255 | Refills: 0 | Status: ON HOLD | COMMUNITY
Start: 2013-01-14

## 2024-01-17 RX ORDER — POTASSIUM CHLORIDE 1500 MG/1
TAKE 1 TABLET DAILY TABLET, EXTENDED RELEASE ORAL
Qty: 30 | Refills: 0 | Status: ACTIVE | COMMUNITY
Start: 2013-06-07

## 2024-01-17 RX ORDER — CEPHALEXIN 500 MG/1
CAPSULE ORAL
Qty: 20 | Refills: 0 | Status: ON HOLD | COMMUNITY
Start: 2014-03-21

## 2024-01-17 RX ORDER — FERROUS SULFATE 325(65) MG
1 TABLET TABLET ORAL
Status: ACTIVE | COMMUNITY

## 2024-01-17 RX ORDER — CHLORHEXIDINE GLUCONATE 4 %
TAKE 1 TABLET DAILY AS DIRECTED LIQUID (ML) TOPICAL
Refills: 0 | Status: ACTIVE | COMMUNITY

## 2024-01-17 RX ORDER — AMLODIPINE BESYLATE 5 MG/1
TAKE 1 TABLET DAILY TABLET ORAL
Refills: 0 | Status: ACTIVE | COMMUNITY
Start: 2020-06-29

## 2024-01-17 RX ORDER — DICLOFENAC SODIUM 10 MG/G
GEL TOPICAL
Qty: 500 | Refills: 0 | Status: ON HOLD | COMMUNITY
Start: 2019-08-02

## 2024-01-17 RX ORDER — HYDROXYZINE HYDROCHLORIDE 10 MG/1
TABLET ORAL
Qty: 60 | Refills: 0 | Status: ON HOLD | COMMUNITY
Start: 2020-07-15

## 2024-01-17 RX ORDER — CARVEDILOL 3.12 MG/1
TAKE 1 TABLET TWICE DAILY,  WITH MORNING AND EVENING MEAL TABLET, FILM COATED ORAL
Refills: 0 | Status: ACTIVE | COMMUNITY
Start: 2020-06-29

## 2024-01-17 RX ORDER — ATORVASTATIN CALCIUM 40 MG/1
TABLET, FILM COATED ORAL
Qty: 90 | Refills: 0 | Status: ON HOLD | COMMUNITY
Start: 2012-03-06

## 2024-01-17 RX ORDER — OXYCODONE 15 MG/1
TAKE 1 TABLET 4 TIMES DAILY TABLET ORAL
Refills: 0 | Status: ON HOLD | COMMUNITY
Start: 2020-06-16

## 2024-01-17 RX ORDER — HYDROCODONE BITARTRATE AND ACETAMINOPHEN 10; 325 MG/1; MG/1
TAKE 1 TABLET BY MOUTH EVERY 4 TO 6 HOURS AS NEEDED FOR PAIN TABLET ORAL
Qty: 80 | Refills: 0 | Status: ON HOLD | COMMUNITY
Start: 2012-08-28

## 2024-01-17 RX ORDER — POLYETHYLENE GLYCOL 3350, SODIUM SULFATE ANHYDROUS, SODIUM BICARBONATE, SODIUM CHLORIDE, POTASSIUM CHLORIDE 236; 22.74; 6.74; 5.86; 2.97 G/4L; G/4L; G/4L; G/4L; G/4L
AS DIRECTED POWDER, FOR SOLUTION ORAL ONCE
Qty: 1 | Refills: 0 | OUTPATIENT
Start: 2024-01-17 | End: 2024-01-18

## 2024-01-17 RX ORDER — DIAZEPAM 5 MG/1
TABLET ORAL
Qty: 3 | Refills: 0 | Status: ON HOLD | COMMUNITY
Start: 2014-02-04

## 2024-01-17 RX ORDER — VENLAFAXINE HYDROCHLORIDE 37.5 MG/1
1 TABLET WITH FOOD TABLET ORAL ONCE A DAY
Status: ACTIVE | COMMUNITY

## 2024-01-17 RX ORDER — ATORVASTATIN CALCIUM 20 MG/1
1 TABLET TABLET, FILM COATED ORAL ONCE A DAY
Refills: 0 | Status: ACTIVE | COMMUNITY
Start: 2012-08-15

## 2024-01-17 NOTE — HPI-OTHER HISTORIES
Colonoscopy (2/1/2017): [place] bowel preparation scale score of nine. Five polyps (3 to 6 mm) resected and retrieved.  Nonbleeding internal hemorrhoids.  Diverticulosis in the sigmoid colon.  Pathology revealed these to be a combination of benign mucosal polyps and tubular adenomas. EGD (7/23/2014): Diffuse candidiasis, mild erosive gastropathy, normal duodenum to D2.  Esophageal biopsies were negative for fungal organisms, gastric  biopsy demonstrated nonspecific changes.  No evidence of H. pylori.  Colonoscopy (2/1/2013): Adequate bowel prep.  Four diminutive polyps in the distal transverse colon.  Diverticulosis in the sigmoid colon.  These were noted to be sessile serrated adenomas without high-grade dysplasia  or malignancy. Colonoscopy (1/24/2008): Adequate bowel prep.  A 9 mm polyp at  55 cm proximal to the anus. Two 5 mm polyps  in the rectosigmoid colon and in the transverse colon.  Diverticulosis of the sigmoid colon.  Internal hemorrhoids were found.  These polyps were noted to be tubular adenomas.

## 2024-01-17 NOTE — HPI-TODAY'S VISIT:
Mr. Davis is a 78-year-old gentleman with a history of chronic back pain status post back surgery on chronic narcotics and adenomatous colon polyps presenting for follow-up.  He was last in the office on 2/4/2022 for follow-up regarding diarrhea predominant change in bowel habits thought to be secondary to medication side effects/possible narcotic medication withdrawal.  Previous stool studies were unremarkable.  He was recommended to use Imodium for relief of exacerbations of diarrhea.  He had generalized pruritus but no evidence of cholestatic liver disease on labs.  He was recommended to follow-up with dermatology.  He reports overall doing well except for a "bad right knee".  He has constipation described as missing days without a bowel movement and straining to pass harder consistency stool.  He denies any red blood per rectum, abdominal pain or weight loss.  He has been taking ferrous sulfate once daily.  He denies any nausea, vomiting, heartburn or difficulty swallowing.  Labs performed 11/28/2023 showed WBC 13.6, hemoglobin 11.7, MCV 86, platelets 450, normal basic metabolic panel, normal liver function tests, negative hepatitis A, B and C serologies.

## 2024-02-02 ENCOUNTER — LAB (OUTPATIENT)
Dept: URBAN - METROPOLITAN AREA CLINIC 4 | Facility: CLINIC | Age: 79
End: 2024-02-02
Payer: MEDICARE

## 2024-02-02 ENCOUNTER — COLON (OUTPATIENT)
Dept: URBAN - METROPOLITAN AREA SURGERY CENTER 25 | Facility: SURGERY CENTER | Age: 79
End: 2024-02-02
Payer: MEDICARE

## 2024-02-02 DIAGNOSIS — Z86.010 ADENOMAS PERSONAL HISTORY OF COLONIC POLYPS: ICD-10-CM

## 2024-02-02 DIAGNOSIS — K63.89 OTHER SPECIFIED DISEASES OF INTESTINE: ICD-10-CM

## 2024-02-02 DIAGNOSIS — D12.5 ADENOMA OF SIGMOID COLON: ICD-10-CM

## 2024-02-02 DIAGNOSIS — D12.3 ADENOMA OF TRANSVERSE COLON: ICD-10-CM

## 2024-02-02 PROCEDURE — 88305 TISSUE EXAM BY PATHOLOGIST: CPT | Performed by: PATHOLOGY

## 2024-02-02 PROCEDURE — 45385 COLONOSCOPY W/LESION REMOVAL: CPT | Performed by: INTERNAL MEDICINE

## 2024-02-02 RX ORDER — FERROUS SULFATE 325(65) MG
1 TABLET TABLET ORAL
Status: ACTIVE | COMMUNITY

## 2024-02-02 RX ORDER — DICLOFENAC SODIUM 1 %
GEL (GRAM) TOPICAL
Qty: 100 | Refills: 0 | Status: ON HOLD | COMMUNITY
Start: 2014-12-02

## 2024-02-02 RX ORDER — HYDROCODONE BITARTRATE AND ACETAMINOPHEN 10; 325 MG/1; MG/1
TAKE 1 TABLET BY MOUTH EVERY 4 TO 6 HOURS AS NEEDED FOR PAIN TABLET ORAL
Qty: 80 | Refills: 0 | Status: ON HOLD | COMMUNITY
Start: 2012-08-28

## 2024-02-02 RX ORDER — OXYCODONE 15 MG/1
TAKE 1 TABLET 4 TIMES DAILY TABLET ORAL
Refills: 0 | Status: ON HOLD | COMMUNITY
Start: 2020-06-16

## 2024-02-02 RX ORDER — HYDROXYZINE HYDROCHLORIDE 10 MG/1
TABLET ORAL
Qty: 60 | Refills: 0 | Status: ON HOLD | COMMUNITY
Start: 2020-07-15

## 2024-02-02 RX ORDER — CEPHALEXIN 500 MG/1
CAPSULE ORAL
Qty: 20 | Refills: 0 | Status: ON HOLD | COMMUNITY
Start: 2014-03-21

## 2024-02-02 RX ORDER — VENLAFAXINE HYDROCHLORIDE 37.5 MG/1
1 TABLET WITH FOOD TABLET ORAL ONCE A DAY
Status: ACTIVE | COMMUNITY

## 2024-02-02 RX ORDER — ATORVASTATIN CALCIUM 20 MG/1
1 TABLET TABLET, FILM COATED ORAL ONCE A DAY
Refills: 0 | Status: ACTIVE | COMMUNITY
Start: 2012-08-15

## 2024-02-02 RX ORDER — DIAZEPAM 5 MG/1
TABLET ORAL
Qty: 3 | Refills: 0 | Status: ON HOLD | COMMUNITY
Start: 2014-02-04

## 2024-02-02 RX ORDER — LIDOCAINE HYDROCHLORIDE 20 MG/ML
SOLUTION ORAL; TOPICAL
Qty: 100 | Refills: 0 | Status: ON HOLD | COMMUNITY
Start: 2019-08-04

## 2024-02-02 RX ORDER — AMLODIPINE BESYLATE 5 MG/1
TAKE 1 TABLET DAILY TABLET ORAL
Refills: 0 | Status: ACTIVE | COMMUNITY
Start: 2020-06-29

## 2024-02-02 RX ORDER — ATORVASTATIN CALCIUM 40 MG/1
TABLET, FILM COATED ORAL
Qty: 90 | Refills: 0 | Status: ON HOLD | COMMUNITY
Start: 2012-03-06

## 2024-02-02 RX ORDER — DICLOFENAC SODIUM 10 MG/G
GEL TOPICAL
Qty: 500 | Refills: 0 | Status: ON HOLD | COMMUNITY
Start: 2019-08-02

## 2024-02-02 RX ORDER — UBIDECARENONE 30 MG
AS DIRECTED CAPSULE ORAL
Status: ON HOLD | COMMUNITY

## 2024-02-02 RX ORDER — POLYETHYLENE GLYCOL 3350 AND ELECTROLYTES WITH LEMON FLAVOR 236; 22.74; 6.74; 5.86; 2.97 G/4L; G/4L; G/4L; G/4L; G/4L
POWDER, FOR SOLUTION ORAL
Qty: 255 | Refills: 0 | Status: ON HOLD | COMMUNITY
Start: 2013-01-14

## 2024-02-02 RX ORDER — CEPHALEXIN 250 MG/1
CAPSULE ORAL
Qty: 21 | Refills: 0 | Status: ON HOLD | COMMUNITY
Start: 2019-08-08

## 2024-02-02 RX ORDER — HYDROCODONE BITARTRATE AND ACETAMINOPHEN 7.5; 325 MG/1; MG/1
TABLET ORAL
Qty: 60 | Refills: 0 | Status: ON HOLD | COMMUNITY
Start: 2012-09-26

## 2024-02-02 RX ORDER — POTASSIUM CHLORIDE 1500 MG/1
TAKE 1 TABLET DAILY TABLET, EXTENDED RELEASE ORAL
Qty: 30 | Refills: 0 | Status: ACTIVE | COMMUNITY
Start: 2013-06-07

## 2024-02-02 RX ORDER — CHLORHEXIDINE GLUCONATE 4 %
TAKE 1 TABLET DAILY AS DIRECTED LIQUID (ML) TOPICAL
Refills: 0 | Status: ACTIVE | COMMUNITY

## 2024-02-02 RX ORDER — HYDROCHLOROTHIAZIDE 25 MG/1
TABLET ORAL
Qty: 90 | Refills: 0 | Status: ON HOLD | COMMUNITY
Start: 2012-05-28

## 2024-02-02 RX ORDER — CARVEDILOL 3.12 MG/1
TAKE 1 TABLET TWICE DAILY,  WITH MORNING AND EVENING MEAL TABLET, FILM COATED ORAL
Refills: 0 | Status: ACTIVE | COMMUNITY
Start: 2020-06-29